# Patient Record
Sex: FEMALE | Race: WHITE | NOT HISPANIC OR LATINO | Employment: FULL TIME | URBAN - METROPOLITAN AREA
[De-identification: names, ages, dates, MRNs, and addresses within clinical notes are randomized per-mention and may not be internally consistent; named-entity substitution may affect disease eponyms.]

---

## 2023-11-29 ENCOUNTER — APPOINTMENT (OUTPATIENT)
Dept: OBSTETRICS AND GYNECOLOGY | Facility: CLINIC | Age: 53
End: 2023-11-29
Payer: COMMERCIAL

## 2024-11-19 ENCOUNTER — APPOINTMENT (OUTPATIENT)
Dept: PRIMARY CARE | Facility: CLINIC | Age: 54
End: 2024-11-19
Payer: COMMERCIAL

## 2024-11-19 VITALS
BODY MASS INDEX: 23.39 KG/M2 | HEIGHT: 63 IN | SYSTOLIC BLOOD PRESSURE: 110 MMHG | HEART RATE: 82 BPM | OXYGEN SATURATION: 100 % | RESPIRATION RATE: 16 BRPM | DIASTOLIC BLOOD PRESSURE: 70 MMHG | WEIGHT: 132 LBS

## 2024-11-19 DIAGNOSIS — Z12.11 COLON CANCER SCREENING: Primary | ICD-10-CM

## 2024-11-19 DIAGNOSIS — Z13.0 SCREENING, ANEMIA, DEFICIENCY, IRON: ICD-10-CM

## 2024-11-19 DIAGNOSIS — Z13.220 LIPID SCREENING: ICD-10-CM

## 2024-11-19 DIAGNOSIS — Z11.3 SCREENING EXAMINATION FOR STI: ICD-10-CM

## 2024-11-19 DIAGNOSIS — Z00.00 WELL ADULT EXAM: ICD-10-CM

## 2024-11-19 DIAGNOSIS — Z23 ENCOUNTER FOR IMMUNIZATION: ICD-10-CM

## 2024-11-19 DIAGNOSIS — Z13.21 ENCOUNTER FOR VITAMIN DEFICIENCY SCREENING: ICD-10-CM

## 2024-11-19 DIAGNOSIS — Z12.31 VISIT FOR SCREENING MAMMOGRAM: ICD-10-CM

## 2024-11-19 DIAGNOSIS — Z13.29 SCREENING FOR THYROID DISORDER: ICD-10-CM

## 2024-11-19 DIAGNOSIS — Z78.0 POSTMENOPAUSAL: ICD-10-CM

## 2024-11-19 DIAGNOSIS — Z12.4 PAP SMEAR FOR CERVICAL CANCER SCREENING: ICD-10-CM

## 2024-11-19 PROBLEM — H26.9 CATARACT: Status: RESOLVED | Noted: 2017-07-25 | Resolved: 2024-11-19

## 2024-11-19 PROBLEM — Z78.9 CAFFEINE USE: Status: ACTIVE | Noted: 2024-11-19

## 2024-11-19 PROCEDURE — 99386 PREV VISIT NEW AGE 40-64: CPT | Performed by: FAMILY MEDICINE

## 2024-11-19 PROCEDURE — 1036F TOBACCO NON-USER: CPT | Performed by: FAMILY MEDICINE

## 2024-11-19 PROCEDURE — 3008F BODY MASS INDEX DOCD: CPT | Performed by: FAMILY MEDICINE

## 2024-11-19 RX ORDER — MULTIVITAMIN/IRON/FOLIC ACID 18MG-0.4MG
1 TABLET ORAL DAILY
COMMUNITY

## 2024-11-19 ASSESSMENT — ENCOUNTER SYMPTOMS
ADENOPATHY: 0
SHORTNESS OF BREATH: 0
DIAPHORESIS: 0
POLYDIPSIA: 0
PALPITATIONS: 0
CONSTIPATION: 0
HEMATURIA: 0
DIARRHEA: 0
SINUS PAIN: 0
NAUSEA: 0
DYSURIA: 0
NUMBNESS: 0
COUGH: 0
DIZZINESS: 0
FEVER: 0
SORE THROAT: 0
CHEST TIGHTNESS: 0
CHILLS: 0
FREQUENCY: 0
UNEXPECTED WEIGHT CHANGE: 0
NERVOUS/ANXIOUS: 0
LIGHT-HEADEDNESS: 0
SINUS PRESSURE: 0
DYSPHORIC MOOD: 0
CONFUSION: 0
VOMITING: 0
HEADACHES: 0
POLYPHAGIA: 0
WHEEZING: 0
ABDOMINAL PAIN: 0

## 2024-11-19 ASSESSMENT — PATIENT HEALTH QUESTIONNAIRE - PHQ9
SUM OF ALL RESPONSES TO PHQ9 QUESTIONS 1 AND 2: 0
2. FEELING DOWN, DEPRESSED OR HOPELESS: NOT AT ALL
1. LITTLE INTEREST OR PLEASURE IN DOING THINGS: NOT AT ALL

## 2024-11-19 NOTE — ASSESSMENT & PLAN NOTE
- Declined flu vaccine.    - recommended the following vaccines at the pharmacy: Shingrix, COVID-19

## 2024-11-19 NOTE — PROGRESS NOTES
Subjective   Patient ID: Victoria Quinn is a 54 y.o. female who presents for new to [facility] (Wants any type of blood work you feel is needed.).    Well Adult Physical   Patient here for a comprehensive physical exam. 54 y.o. female here to establish care. Past medical history, past surgical history, medications, allergies, family history, and social history reviewed with patient and updated in chart.     Not currently sexually active. Last time was a year ago. She is interested in STI testing. She does not have any symptoms, just wants peace of mind.     Victoria reports her health as Excellent.    Does the patient take any herbs or supplements that were not prescribed by a doctor? yes   Is the patiet taking calcium supplements? yes   Is the patient taking aspirin daily? no    Outpatient Medications Prior to Visit   Medication Sig Dispense Refill    ASHWAGANDHA EXTRACT ORAL Take by mouth.      b complex 0.4 mg tablet Take 1 tablet by mouth once daily.      docosahexaenoic acid/epa (FISH OIL ORAL) Take by mouth.      MAGNESIUM GLYCINATE ORAL Take by mouth.      multivit-min/ferrous fumarate (MULTI VITAMIN ORAL) Take by mouth.      NON FORMULARY Spurlina, chorella      prasterone, DHEA, (DHEA ORAL) Take by mouth.      VITAMIN D2-VITAMIN K1 ORAL Take by mouth.       No facility-administered medications prior to visit.       Social History     Socioeconomic History    Marital status:     Number of children: 2   Tobacco Use    Smoking status: Never    Smokeless tobacco: Never   Vaping Use    Vaping status: Never Used   Substance and Sexual Activity    Alcohol use: Not Currently    Drug use: Never    Sexual activity: Not Currently        History:  LMP: 2018  Menopause at 48 years  Last pap date: 22  Abnormal pap? no  : 2  Para: 2    E-cigarette/Vaping       Questions Responses    E-cigarette/Vaping Use Never User            Last Dental Exam: 6 months or less    Last Eye Exam: more than 6  "months    Diet: in general, a \"healthy\" diet      Exercise: frequently    Health Maintenance Due   Topic Date Due    Yearly Adult Physical  Never done    HIV Screening  Never done    MMR Vaccines (1 of 1 - Standard series) Never done    Hepatitis C Screening  Never done    Hepatitis B Vaccines (1 of 3 - 19+ 3-dose series) Never done    Mammogram  02/06/2020    Zoster Vaccines (1 of 2) Never done    Colorectal Cancer Screening  03/04/2024    Influenza Vaccine (1) 09/01/2024    COVID-19 Vaccine (1 - 2024-25 season) Never done            Review of Systems   Constitutional:  Negative for chills, diaphoresis, fever and unexpected weight change.   HENT:  Negative for congestion, sinus pressure, sinus pain, sneezing and sore throat.    Respiratory:  Negative for cough, chest tightness, shortness of breath and wheezing.    Cardiovascular:  Negative for chest pain, palpitations and leg swelling.   Gastrointestinal:  Negative for abdominal pain, constipation, diarrhea, nausea and vomiting.   Endocrine: Negative for cold intolerance, heat intolerance, polydipsia, polyphagia and polyuria.   Genitourinary:  Negative for dysuria, frequency, hematuria and urgency.   Neurological:  Negative for dizziness, syncope, light-headedness, numbness and headaches.   Hematological:  Negative for adenopathy.   Psychiatric/Behavioral:  Negative for confusion and dysphoric mood. The patient is not nervous/anxious.          Objective   /70 (BP Location: Right arm, Patient Position: Sitting, BP Cuff Size: Adult)   Pulse 82   Resp 16   Ht 1.6 m (5' 3\")   Wt 59.9 kg (132 lb)   SpO2 100%   BMI 23.38 kg/m²     Physical Exam  Vitals and nursing note reviewed.   Constitutional:       General: She is not in acute distress.     Appearance: Normal appearance.   HENT:      Head: Normocephalic and atraumatic.      Nose: Nose normal.   Eyes:      Extraocular Movements: Extraocular movements intact.      Conjunctiva/sclera: Conjunctivae normal.    "   Pupils: Pupils are equal, round, and reactive to light.   Cardiovascular:      Rate and Rhythm: Normal rate and regular rhythm.      Heart sounds: No murmur heard.     No friction rub. No gallop.   Pulmonary:      Effort: Pulmonary effort is normal.      Breath sounds: Normal breath sounds. No wheezing, rhonchi or rales.   Abdominal:      General: Bowel sounds are normal. There is no distension.      Palpations: Abdomen is soft.      Tenderness: There is no abdominal tenderness.   Musculoskeletal:         General: Normal range of motion.      Cervical back: Normal range of motion and neck supple.   Skin:     General: Skin is warm and dry.   Neurological:      General: No focal deficit present.      Mental Status: She is alert and oriented to person, place, and time.      Deep Tendon Reflexes: Reflexes normal.   Psychiatric:         Mood and Affect: Mood normal.         Behavior: Behavior normal.         Thought Content: Thought content normal.         Judgment: Judgment normal.           Assessment/Plan   Problem List Items Addressed This Visit             ICD-10-CM    Colon cancer screening - Primary Z12.11     - negative cologuard 3/2021  - check cologuard          Relevant Orders    Cologuard® colon cancer screening    Encounter for immunization Z23     - Declined flu vaccine.    - recommended the following vaccines at the pharmacy: Shingrix, COVID-19         Pap smear for cervical cancer screening Z12.4     - normal pap 7/14/22. Repeat 2027         Postmenopausal Z78.0    Relevant Orders    Follicle Stimulating Hormone    Luteinizing Hormone    Estrogens, Total    Visit for screening mammogram Z12.31     - normal mammogram in 2/2019  - does not want to continue mammograms at this time  - had thermography last week and that was normal           Other Visit Diagnoses         Codes    Well adult exam     Z00.00    Relevant Orders    Vitamin B12    CBC and Auto Differential    Comprehensive Metabolic Panel     Lipid Panel    TSH with reflex to Free T4 if abnormal    Lipid screening     Z13.220    Relevant Orders    Lipid Panel    Screening examination for STI     Z11.3    Relevant Orders    C. trachomatis / N. gonorrhoeae, Amplified    Hepatitis Panel, Acute    HIV 1/2 Antigen/Antibody Screen with Reflex to Confirmation    Syphilis Screen with Reflex    Encounter for vitamin deficiency screening     Z13.21    Relevant Orders    Vitamin B12    Vitamin D 25-Hydroxy,Total (for eval of Vitamin D levels)    Screening for thyroid disorder     Z13.29    Relevant Orders    TSH with reflex to Free T4 if abnormal    Screening, anemia, deficiency, iron     Z13.0    Relevant Orders    CBC and Auto Differential    Ferritin    Iron and TIBC

## 2024-11-19 NOTE — ASSESSMENT & PLAN NOTE
- normal mammogram in 2/2019  - does not want to continue mammograms at this time  - had thermography last week and that was normal

## 2024-11-19 NOTE — PATIENT INSTRUCTIONS
Victoria Quinn ,    Thank you for coming in today. We at [facility] appreciate your trust in our care. If you have any questions or concerns about the care you received today, please do not hesitate to contact us at [phone].    The following instructions were discussed today:    - get labs. For blood work: Nothing to eat or drink for at least 10 hours prior. Okay for water or black coffee.   - do cologuard for colon cancer screening

## 2024-12-09 ENCOUNTER — TELEPHONE (OUTPATIENT)
Dept: PRIMARY CARE | Facility: CLINIC | Age: 54
End: 2024-12-09
Payer: COMMERCIAL

## 2024-12-09 DIAGNOSIS — Z13.6 SCREENING FOR ISCHEMIC HEART DISEASE: Primary | ICD-10-CM

## 2024-12-09 PROBLEM — E78.2 MIXED HYPERLIPIDEMIA: Status: ACTIVE | Noted: 2024-12-09

## 2024-12-09 LAB — NONINV COLON CA DNA+OCC BLD SCRN STL QL: NEGATIVE

## 2024-12-10 NOTE — TELEPHONE ENCOUNTER
Spoke with patient she would like to review lab results first before scheduling an appt. She isn't worried about her numbers but stated once she reviewed them and if she was concerned she will call back to schedule a virtual appt.

## 2024-12-10 NOTE — TELEPHONE ENCOUNTER
Pt called back, she does not need an appt but she would however like to be scheduled for the calcium score CT scan.

## 2025-01-09 LAB — NONINV COLON CA DNA+OCC BLD SCRN STL QL: NEGATIVE

## 2025-04-17 ENCOUNTER — HOSPITAL ENCOUNTER (OUTPATIENT)
Dept: RADIOLOGY | Facility: CLINIC | Age: 55
Discharge: HOME | End: 2025-04-17
Payer: COMMERCIAL

## 2025-04-17 DIAGNOSIS — Z13.6 SCREENING FOR ISCHEMIC HEART DISEASE: ICD-10-CM

## 2025-04-17 PROCEDURE — 75571 CT HRT W/O DYE W/CA TEST: CPT

## 2025-06-09 ENCOUNTER — ANCILLARY PROCEDURE (OUTPATIENT)
Facility: HOSPITAL | Age: 55
End: 2025-06-09
Payer: COMMERCIAL

## 2025-06-09 DIAGNOSIS — H50.21 VERTICAL STRABISMUS, RIGHT EYE: ICD-10-CM

## 2025-06-09 DIAGNOSIS — H50.00 UNSPECIFIED ESOTROPIA: ICD-10-CM

## 2025-06-09 DIAGNOSIS — H53.2 DIPLOPIA: ICD-10-CM

## 2025-06-09 PROCEDURE — 70553 MRI BRAIN STEM W/O & W/DYE: CPT | Performed by: RADIOLOGY

## 2025-06-09 PROCEDURE — A9575 INJ GADOTERATE MEGLUMI 0.1ML: HCPCS

## 2025-06-09 PROCEDURE — 70553 MRI BRAIN STEM W/O & W/DYE: CPT

## 2025-06-09 PROCEDURE — 2550000001 HC RX 255 CONTRASTS

## 2025-06-09 RX ORDER — GADOTERATE MEGLUMINE 376.9 MG/ML
15 INJECTION INTRAVENOUS
Status: COMPLETED | OUTPATIENT
Start: 2025-06-09 | End: 2025-06-09

## 2025-06-09 RX ADMIN — GADOTERATE MEGLUMINE 15 ML: 376.9 INJECTION INTRAVENOUS at 16:40

## 2025-11-20 ENCOUNTER — APPOINTMENT (OUTPATIENT)
Dept: PRIMARY CARE | Facility: CLINIC | Age: 55
End: 2025-11-20
Payer: COMMERCIAL

## 2025-11-24 ENCOUNTER — APPOINTMENT (OUTPATIENT)
Dept: PRIMARY CARE | Facility: CLINIC | Age: 55
End: 2025-11-24
Payer: COMMERCIAL